# Patient Record
Sex: MALE | URBAN - METROPOLITAN AREA
[De-identification: names, ages, dates, MRNs, and addresses within clinical notes are randomized per-mention and may not be internally consistent; named-entity substitution may affect disease eponyms.]

---

## 2022-03-24 ENCOUNTER — OFFICE VISIT (OUTPATIENT)
Dept: URBAN - METROPOLITAN AREA CLINIC 60 | Facility: CLINIC | Age: 67
End: 2022-03-24
Payer: COMMERCIAL

## 2022-03-24 DIAGNOSIS — H25.12 AGE-RELATED NUCLEAR CATARACT, LEFT EYE: ICD-10-CM

## 2022-03-24 DIAGNOSIS — H02.34 BLEPHAROCHALASIS OF LEFT UPPER EYELID: ICD-10-CM

## 2022-03-24 DIAGNOSIS — H11.042 PERIPHERAL PTERYGIUM, STATIONARY, LEFT EYE: ICD-10-CM

## 2022-03-24 DIAGNOSIS — E11.9 TYPE 2 DIABETES MELLITUS W/O COMPLICATION: Primary | ICD-10-CM

## 2022-03-24 DIAGNOSIS — H25.811 COMBINED FORMS OF AGE-RELATED CATARACT, RIGHT EYE: ICD-10-CM

## 2022-03-24 DIAGNOSIS — H02.31 BLEPHAROCHALASIS RIGHT UPPER LID: ICD-10-CM

## 2022-03-24 PROCEDURE — 92004 COMPRE OPH EXAM NEW PT 1/>: CPT | Performed by: OPTOMETRIST

## 2022-03-24 ASSESSMENT — KERATOMETRY
OS: 42.59
OD: 42.62

## 2022-03-24 ASSESSMENT — VISUAL ACUITY
OD: 20/30
OS: 20/20

## 2022-03-24 ASSESSMENT — INTRAOCULAR PRESSURE
OD: 14
OS: 15

## 2022-03-24 NOTE — IMPRESSION/PLAN
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 Plan: Patient educated on findings. Recommend patient perform cold compresses and a allergy drop as needed. Hand out for Pat[employer] given.

## 2022-03-24 NOTE — IMPRESSION/PLAN
Impression: Type 2 diabetes mellitus w/o complication: I86.2. Plan: No evidence of diabetic retinopathy or diabetic macular edema. Discussed ocular and systemic benefits of blood sugar control. Stressed importance of yearly diabetic eye exams.